# Patient Record
Sex: FEMALE | Race: AMERICAN INDIAN OR ALASKA NATIVE
[De-identification: names, ages, dates, MRNs, and addresses within clinical notes are randomized per-mention and may not be internally consistent; named-entity substitution may affect disease eponyms.]

---

## 2020-04-13 ENCOUNTER — HOSPITAL ENCOUNTER (OUTPATIENT)
Dept: HOSPITAL 5 - TRG | Age: 35
Setting detail: OBSERVATION
Discharge: HOME | End: 2020-04-13
Attending: OBSTETRICS & GYNECOLOGY | Admitting: OBSTETRICS & GYNECOLOGY
Payer: MEDICAID

## 2020-04-13 VITALS — SYSTOLIC BLOOD PRESSURE: 128 MMHG | DIASTOLIC BLOOD PRESSURE: 67 MMHG

## 2020-04-13 DIAGNOSIS — Z3A.39: ICD-10-CM

## 2020-04-13 DIAGNOSIS — O26.893: ICD-10-CM

## 2020-04-13 DIAGNOSIS — R00.0: ICD-10-CM

## 2020-04-13 LAB
ALBUMIN SERPL-MCNC: 3.3 G/DL (ref 3.9–5)
ALT SERPL-CCNC: 8 UNITS/L (ref 7–56)
BASOPHILS # (AUTO): 0 K/MM3 (ref 0–0.1)
BASOPHILS NFR BLD AUTO: 0.4 % (ref 0–1.8)
BUN SERPL-MCNC: 10 MG/DL (ref 7–17)
BUN/CREAT SERPL: 14 %
CALCIUM SERPL-MCNC: 10.1 MG/DL (ref 8.4–10.2)
CRP SERPL-MCNC: 1.6 MG/DL (ref 0–1.3)
EOSINOPHIL # BLD AUTO: 0 K/MM3 (ref 0–0.4)
EOSINOPHIL NFR BLD AUTO: 0.2 % (ref 0–4.3)
HCT VFR BLD CALC: 33.4 % (ref 30.3–42.9)
HEMOLYSIS INDEX: 25
HGB BLD-MCNC: 11 GM/DL (ref 10.1–14.3)
LYMPHOCYTES # BLD AUTO: 1.1 K/MM3 (ref 1.2–5.4)
LYMPHOCYTES NFR BLD AUTO: 17.8 % (ref 13.4–35)
MCHC RBC AUTO-ENTMCNC: 33 % (ref 30–34)
MCV RBC AUTO: 91 FL (ref 79–97)
MONOCYTES # (AUTO): 0.5 K/MM3 (ref 0–0.8)
MONOCYTES % (AUTO): 8.6 % (ref 0–7.3)
PLATELET # BLD: 199 K/MM3 (ref 140–440)
RBC # BLD AUTO: 3.68 M/MM3 (ref 3.65–5.03)

## 2020-04-13 PROCEDURE — 76815 OB US LIMITED FETUS(S): CPT

## 2020-04-13 PROCEDURE — 86140 C-REACTIVE PROTEIN: CPT

## 2020-04-13 PROCEDURE — 93010 ELECTROCARDIOGRAM REPORT: CPT

## 2020-04-13 PROCEDURE — 59025 FETAL NON-STRESS TEST: CPT

## 2020-04-13 PROCEDURE — G0378 HOSPITAL OBSERVATION PER HR: HCPCS

## 2020-04-13 PROCEDURE — 93005 ELECTROCARDIOGRAM TRACING: CPT

## 2020-04-13 PROCEDURE — 82728 ASSAY OF FERRITIN: CPT

## 2020-04-13 PROCEDURE — 36415 COLL VENOUS BLD VENIPUNCTURE: CPT

## 2020-04-13 PROCEDURE — 85025 COMPLETE CBC W/AUTO DIFF WBC: CPT

## 2020-04-13 PROCEDURE — 84145 PROCALCITONIN (PCT): CPT

## 2020-04-13 PROCEDURE — 83615 LACTATE (LD) (LDH) ENZYME: CPT

## 2020-04-13 PROCEDURE — 80053 COMPREHEN METABOLIC PANEL: CPT

## 2020-04-13 PROCEDURE — 85379 FIBRIN DEGRADATION QUANT: CPT

## 2020-04-13 NOTE — CONSULTATION
History of Present Illness


Consult date: 04/13/20


Requesting physician: LENCHO MI


Consult reason: tachycardia


History of present illness: 


The pt is a 35YO female who is 39+ wks pregnant who presented to triage with c/o

contractions. Pt states that she thought she was in labor. Per the chart, upon 

nursing assessment, she was found to have a HR of 180. Admission ECG shows NSR 

with HR WNL. There are no telemetry strips available for review. Pt denies any 

cardiac complaints. She states that she is known to have a history of 

tachycardia. She has been seen in our office by Dr. Serrano. She underwent 48Hr 

Holter study in 1/2020 which showed persistent sinus tachycardia with isolated 

PACs, otherwise no significant arrhythmias noted. Echo done 1/31/2020 showed EF 

55-60%, unremarkable except for sigmoid septum measuring 1.3cm.








Past History


Past Medical History: other (as per HPI)





Medications and Allergies


                                    Allergies











Allergy/AdvReac Type Severity Reaction Status Date / Time


 


No Known Allergies Allergy   Unverified 04/13/20 07:04














Review of Systems


Constitutional: no weight loss, no weight gain, no fever, no chills, no sweats


Ears, nose, mouth and throat: no ear pain, no nose pain, no sinus pressure, no 

sinus pain


Cardiovascular: no chest pain, no orthopnea, no palpitations, no syncope, no 

lightheadedness, no shortness of breath, no dyspnea on exertion, no high blood 

pressure


Respiratory: no cough, no shortness of breath, no dyspnea on exertion, no 

congestion, no wheezing, no pain on inspiration


Gastrointestinal: other (uterine contractions), no nausea, no vomiting, no 

diarrhea, no constipation, no change in bowel habits


Genitourinary Female: pregnant, no pelvic pain, no flank pain, no dysuria, no 

urinary frequency, no urgency


Musculoskeletal: no neck stiffness, no neck pain, no shooting arm pain, no arm 

numbness/tingling, no low back pain, no shooting leg pain


Integumentary: no rash, no pruritis, no redness, no sores, no wounds


Neurological: no head injury, no paralysis, no weakness, no parathesias, no 

numbness, no tingling, no seizures, no syncope


Psychiatric: no anxiety


Endocrine: no cold intolerance, no heat intolerance


Hematologic/Lymphatic: no easy bruising


Allergic/Immunologic: no urticaria





Physical Examination


                                   Vital Signs











Pulse BP Pulse Ox


 


 124 H  106/68   97 


 


 04/13/20 05:00  04/13/20 05:00  04/13/20 05:00











General appearance: no acute distress


HEENT: Positive: PERRL, Normocephaly, Mucus Membranes Moist


Neck: Positive: neck supple, trachea midline


Cardiac: Positive: Regular Rhythm, S1/S2


Lungs: Positive: Decreased Breath Sounds


Neuro: Positive: Grossly Intact


Abdomen: Positive: Other (pregnant)


Skin: Negative: Rash


Musculoskeletal: No Pain


Extremities: Absent: edema





Results





                                 04/13/20 06:43





                                 04/13/20 06:43


                                 Cardiac Enzymes











  04/13/20 Range/Units





  06:43 


 


AST  14  (5-40)  units/L


 


Lactate Dehydrogenase  194 H  ()  units/L








                                       CBC











  04/13/20 Range/Units





  06:43 


 


WBC  6.2  (4.5-11.0)  K/mm3


 


RBC  3.68  (3.65-5.03)  M/mm3


 


Hgb  11.0  (10.1-14.3)  gm/dl


 


Hct  33.4  (30.3-42.9)  %


 


Plt Count  199  (140-440)  K/mm3


 


Lymph #  1.1 L  (1.2-5.4)  K/mm3


 


Mono #  0.5  (0.0-0.8)  K/mm3


 


Eos #  0.0  (0.0-0.4)  K/mm3


 


Baso #  0.0  (0.0-0.1)  K/mm3








                          Comprehensive Metabolic Panel











  04/13/20 Range/Units





  06:43 


 


Sodium  136 L  (137-145)  mmol/L


 


Potassium  4.0  (3.6-5.0)  mmol/L


 


Chloride  101.3  ()  mmol/L


 


Carbon Dioxide  19 L  (22-30)  mmol/L


 


BUN  10  (7-17)  mg/dL


 


Creatinine  0.7  (0.7-1.2)  mg/dL


 


Glucose  90  ()  mg/dL


 


Calcium  10.1  (8.4-10.2)  mg/dL


 


AST  14  (5-40)  units/L


 


ALT  8  (7-56)  units/L


 


Alkaline Phosphatase  106  ()  units/L


 


Total Protein  7.2  (6.3-8.2)  g/dL


 


Albumin  3.3 L  (3.9-5)  g/dL














- Imaging and Cardiology


Echo: report reviewed (1/31/2020 showed EF 55-60%, unremarkable except for 

sigmoid septum measuring 1.3cm.)


Holter: report reviewed (48Hr Holter study in 1/2020 which showed persistent 

sinus tachycardia with isolated PACs, otherwise no significant arrhythmias 

noted.)


EKG: report reviewed, image reviewed





EKG interpretations





- Telemetry


EKG Rhythm: Sinus Rhythm





- EKG


Sinus rhythms and dysrhythmias: sinus rhythm





Assessment and Plan


Pt has known history of sinus tachycardia. She has been seen in our office by 

Dr. Serrano. She underwent 48Hr Holter study in 1/2020 which showed persistent 

sinus tachycardia with isolated PACs, otherwise no significant arrhythmias 

noted. Echo done 1/31/2020 showed EF 55-60%, unremarkable except for sigmoid 

septum measuring 1.3cm. 


Currently stable cardiac status. Pt has no current cardiac complaints. Suspect 

sinus tachycardia will improve following delivery. Per OB/GYN team, pt to 

discharge home. Nothing further to add from cardiac perspective at this time. 

Recommend pt follow up in our office with Dr. Serrano within 1 month 

([phone]). 





The patient has been seen in conjunction with Dr. De León who agrees with the 

assessment and plan of care. 








- Patient Problems


(1) Sinus tachycardia


Current Visit: Yes   Status: Acute   





(2) Pregnant


Current Visit: Yes   Status: Acute

## 2020-04-13 NOTE — EVENT NOTE
Date: 04/13/20





Maternl heart on arrival was noted to be 200 and palpated ~same by CNM. Patient 

complained of contractions and ? ROM. She denies CP or SOB. She was placed on 

cardiac monitor, EKG, CMP and CBC ordered and Hospitalist consulted. Will 

consult Cardiology as well. Now 's with fetal tachycardia. Hospitalist 

present to evaluated. Will start IVF. Observe closely

## 2020-04-13 NOTE — CONSULTATION
History of Present Illness





- History of Present Illness


34-year-old woman with no medical problems, , 37 weeks pregnant is being seen 

for tachycardia.  Patient's heart rate was in the 180s upon arrival, her heart 

rate is now 122, normal sinus rhythm without intervention.  Patient saw a 

cardiologist 1-1/2 months ago for irregular heart rate, tachycardia, he wore a 

Holter monitor.  Her evaluation showed PACs, her echo was normal, she was told 

to follow-up with cardiology in the next seven months.  The patient came to 

labor and delivery because she started having contractions.  She is comfortable 

in bed, no shortness of breath, palpitations, chest pain, currently has no 

abdominal pain or contractions, fever, sick contacts, cough


Review Of  Systems:


Constitutional: no weight loss, fever, chills


Ears, eyes, nose, mouth and throat: no nasal congestion, no nasal discharge, no 

sinus pressure, blurry vision, diplopia


Neck: No neck pain or rigidity.


Cardiovascular: No  palpitations, chest pain


Respiratory: No shortness of breath, cough


Gastrointestinal: No hematochezia


Genitourinary : no dysuria, frequency


Musculoskeletal: no muscle ache , joint pain


Integumentary: no rash, no pruritis


Neurological: no parathesias, focal weakness


Endocrine: no cold or heat intolerance, no polyuria or polydipsia


Hematologic/Lymphatic: no easy bruising, no easy bleeding, no gland swelling


Allergic/Immunologic: no urticaria, no angioedema.





PAST MEDICAL HISTORY: None





PAST SURGICAL HISTORY: Excision of fallopian tube





SOCIAL HISTORY: Denies alcohol, tobacco,  drugs





FAMILY HISTORY: Hypertension














Medications and Allergies


Active Meds: 


Active Medications





Labetalol HCl (Labetalol)  50 mg PO ONCE ONE


   Stop: 04/13/20 06:32











Exam





- Physical Exam


Narrative exam: 


Gen. appearance: Patient lying in bed, no apparent distress


HEENT: Normocephalic, atraumatic, pupils equally round and reactive to light, 

extraocular movement intact, and no sclericterus,. No JVD or thyromegaly or 

nodule,neck supple, no carotid bruit ,mucous membranes moist, no exudate or 

erythema


Heart: S1, S2, regular rate and rhythm


Lungs: Clear bilaterally, breathing comfortable


Abdomen: Positive bowel sounds, nontender, nondistended, no organomegaly


Extremity: no edema, cyanosis, clubbing


Skin: No rash, nodules, warm, dry


Neuro: Hernial nerves II to XII intact speech is fluent, moves extremities, 

sensory intact











- Constitutional


Vitals: 


                                        











Temp Pulse Resp BP Pulse Ox


 


 98.5 F   116 H  18   127/58   97 


 


 04/13/20 06:30  04/13/20 06:30  04/13/20 06:30  04/13/20 06:30  04/13/20 06:30














Assessment and Plan


Assessment


Sinus tachycardia


Give a one-time low-dose labetalol now, start fluids


Consult her cardiologist


Case discussed with Dr. Lemons


Will sign out to incoming hospistalist


labs are pending

## 2020-04-13 NOTE — ULTRASOUND REPORT
ULTRASOUND OBSTETRIC LIMITED 



INDICATION / CLINICAL INFORMATION: Fetal well being: fetal tachycardia.



TECHNIQUE: Transabdominal ultrasound imaging.



COMPARISON: None available.



FINDINGS:



FETAL HEART RATE (beats per minute): 165 



AMNIOTIC FLUID INDEX (cm) =  10.4



PRESENTATION: Cephalic. 



ADDITIONAL FINDINGS: None.



IMPRESSION:

No significant abnormality.



Signer Name: Chema Oliva Jr, MD 

Signed: 4/13/2020 8:13 AM

Workstation Name: [id number]

## 2020-04-13 NOTE — EVENT NOTE
Date: 04/13/20





Discussed with Dr De León via telephone.Patient is stable for discharge from 

cardiology standpoint ( see cardilology note) .Will discharge patient will 

normal labor precaution f/u appointment in our office on 4/15/2020.

## 2020-04-17 ENCOUNTER — HOSPITAL ENCOUNTER (OUTPATIENT)
Dept: HOSPITAL 5 - TRG | Age: 35
LOS: 1 days | Discharge: HOME | End: 2020-04-18
Attending: OBSTETRICS & GYNECOLOGY
Payer: MEDICAID

## 2020-04-17 VITALS — DIASTOLIC BLOOD PRESSURE: 71 MMHG | SYSTOLIC BLOOD PRESSURE: 117 MMHG

## 2020-04-17 DIAGNOSIS — Z3A.39: ICD-10-CM

## 2020-04-17 DIAGNOSIS — O47.1: Primary | ICD-10-CM

## 2020-04-17 PROCEDURE — 59025 FETAL NON-STRESS TEST: CPT

## 2020-04-19 ENCOUNTER — HOSPITAL ENCOUNTER (INPATIENT)
Dept: HOSPITAL 5 - LD | Age: 35
LOS: 2 days | Discharge: HOME | End: 2020-04-21
Attending: OBSTETRICS & GYNECOLOGY | Admitting: OBSTETRICS & GYNECOLOGY
Payer: MEDICAID

## 2020-04-19 DIAGNOSIS — E66.01: ICD-10-CM

## 2020-04-19 DIAGNOSIS — Z23: ICD-10-CM

## 2020-04-19 DIAGNOSIS — B00.9: ICD-10-CM

## 2020-04-19 DIAGNOSIS — Z3A.40: ICD-10-CM

## 2020-04-19 LAB
HCT VFR BLD CALC: 34 % (ref 30.3–42.9)
HGB BLD-MCNC: 11.5 GM/DL (ref 10.1–14.3)
MCHC RBC AUTO-ENTMCNC: 34 % (ref 30–34)
MCV RBC AUTO: 90 FL (ref 79–97)
PLATELET # BLD: 191 K/MM3 (ref 140–440)
RBC # BLD AUTO: 3.8 M/MM3 (ref 3.65–5.03)

## 2020-04-19 PROCEDURE — 85018 HEMOGLOBIN: CPT

## 2020-04-19 PROCEDURE — 85027 COMPLETE CBC AUTOMATED: CPT

## 2020-04-19 PROCEDURE — 86592 SYPHILIS TEST NON-TREP QUAL: CPT

## 2020-04-19 PROCEDURE — 86900 BLOOD TYPING SEROLOGIC ABO: CPT

## 2020-04-19 PROCEDURE — 59200 INSERT CERVICAL DILATOR: CPT

## 2020-04-19 PROCEDURE — 85014 HEMATOCRIT: CPT

## 2020-04-19 PROCEDURE — 86850 RBC ANTIBODY SCREEN: CPT

## 2020-04-19 PROCEDURE — 86901 BLOOD TYPING SEROLOGIC RH(D): CPT

## 2020-04-19 PROCEDURE — 36415 COLL VENOUS BLD VENIPUNCTURE: CPT

## 2020-04-19 PROCEDURE — 59025 FETAL NON-STRESS TEST: CPT

## 2020-04-19 RX ADMIN — SODIUM CHLORIDE, SODIUM LACTATE, POTASSIUM CHLORIDE, AND CALCIUM CHLORIDE SCH MLS/HR: .6; .31; .03; .02 INJECTION, SOLUTION INTRAVENOUS at 21:52

## 2020-04-19 RX ADMIN — SODIUM CHLORIDE, SODIUM LACTATE, POTASSIUM CHLORIDE, AND CALCIUM CHLORIDE SCH MLS/HR: .6; .31; .03; .02 INJECTION, SOLUTION INTRAVENOUS at 23:21

## 2020-04-19 RX ADMIN — BUTORPHANOL TARTRATE PRN MG: 2 INJECTION, SOLUTION INTRAMUSCULAR; INTRAVENOUS at 23:35

## 2020-04-19 NOTE — HISTORY AND PHYSICAL REPORT
History of Present Illness


Date of examination: 20


Date of admission: 


20 20:25





Chief complaint: 





IOL Per [facility] recommendations @ 40+01 weeks


History of present illness: 


EDC Confirmation:  2020





Past Pregnancy History 


   :      3


   Term Births:   0


   Premature Births:   1


   Living Children:   1


   Para:      1


   Mult. Births:   0


   Prev :   0


   Prev.  attempt?   none


   Aborta:      0


   Elect. Ab:      0


   Spont. Ab:      0


   Ectopics:      1





Pregnancy # 1


   Delivery date:     2012


   Weeks Gestation:   34.4


    labor:     yes


   Delivery type:     


   Anesthesia type:     none


   Infant Sex:      Female


   Birth weight:      5#


   Name:      Natalie


   Comments:      delivered at [facility], [place]





Pregnancy # 2


   Delivery date:     2013


   Comments:      ectopic








Past Medical History:


   Negative Past Medical History





Past Surgical History:


   negative





Past Medical History 


Anesthesia Complications: negative


Anemia: negative


Autoimmune Disorder: negative


Bleeding Disorder: negative


Blood Transfusions: negative


Breast Disease: negative


Diabetes: negative


Heart Disease: negative


Hypertension: negative


Hepatitis/Liver Disease: negative


Kidney Disease/UTI: negative


Neurologic/Epilepsy/Migraines: negative


Phlebitis/Varicosities: negative


Psychiatric: negative


Pulmonary Disease/Asthma: negative


Thyroid Disease: negative


Hospitalizations: negative


Surgery (Non-gyn): negative


Abnormal PAP: negative


SOSA Exposure: negative


Infertility: negative


Uterine Anomaly: negative


Uterine Surgery (not C/S): negative





Infection History 


Personal hx. of genital herpes: yes


Varicella/Chicken Pox Status: Previous Disease





Genetic History 


 Congenital Heart Defect:


    Mom: no  Dad: no


Canavan Disease:


    Mom: no  Dad: no


Thalassemia


    Mom: no  Dad: no


Neural Tube Defect


    Mom: no  Dad: no


Down's Syndrome


    Mom: no  Dad: no


Cj-Sachs


    Mom: no  Dad: no


Sickle Cell Disease/Trait


    Mom: no  Dad: no


Hemophilia


    Mom: no  Dad: no


Muscular Dystrophy


    Mom: no  Dad: no


Cystic Fibrosis


    Mom: no  Dad: no


[place] Chorea


    Mom: no  Dad: no


Mental Retardation


    Mom: no  Dad: no


Fragile X


    Mom: no  Dad: no


Other Genetic/Chromosomal Disorder


    Mom: no  Dad: no


Child w/other birth defect


    Mom: no  Dad: no





Enviromental Exposures 


Xray Exposure: no


Medication, drug, or alcohol use since LMP: no


Chemical/Other Exposure: no


Exposure to Cat Liter: no


Hx of Parvovirus (Fifth Disease): no


Occupational Exposure to Children: none


Current Allergies (reviewed today):


No known allergies








Past History


Past Medical History: other (See HPI)


Past Surgical History: other (See HPI)


GYN History: herpes


Family/Genetic History: other (see HPI)


Social history: no significant social history





- Obstetrical History


Expected Date of Delivery: 20


Actual Gestation: 40 Week(s) 1 Day(s) 


: 3


Para: 1


Hx # Term Pregnancies: 0


Number of  Pregnancies: 1


Spontaneous Abortions: 1 (ectopic)


Induced : 0


Number of Living Children: 1





Medications and Allergies


                                    Allergies











Allergy/AdvReac Type Severity Reaction Status Date / Time


 


No Known Allergies Allergy   Unverified 20 07:04











Active Meds: 


Active Medications





Butorphanol Tartrate (Stadol)  2 mg IV Q2H PRN


   PRN Reason: Pain , Severe (7-10)


Dinoprostone (Cervidil)  10 mg VG ONCE ONE


   Stop: 20 22:01


Ephedrine Sulfate (Ephedrine Sulfate)  10 mg IV Q2M PRN


   PRN Reason: Hypotension


Oxytocin/Sodium Chloride (Pitocin/Ns 20 Unit/1000ml Drip)  20 units in 1,000 mls

@ 125 mls/hr IV AS DIRECT ROSEMARIE


Lactated Ringer's (Lactated Ringers)  1,000 mls @ 125 mls/hr IV AS DIRECT ROSEMARIE


Lidocaine (Xylocaine 2%)  20 ml INFILTRATI ONCE ONE


   Stop: 20 22:01


Mineral Oil (Mineral Oil)  30 ml PO QHS PRN


   PRN Reason: Constipation


Terbutaline Sulfate (Brethine)  0.25 mg SUB-Q ONCE PRN


   PRN Reason: Hyperstimulation/Hypertonicity











Review of Systems


All systems: negative


Genitourinary: no genital sores





- Vital Signs


Vital signs: 


                                   Vital Signs











Pulse Pulse Ox


 


 89   100 


 


 20 20:57  20 20:57








                                        











Temp Pulse Resp BP Pulse Ox


 


    110 H     119/67   94 


 


    20 20:58     20 20:58  20 20:58














- Physical Exam


Breasts: Positive: normal


Cardiovascular: Regular rate


Lungs: Positive: Normal air movement


Abdomen: Positive: normal appearance, soft


Genitourinary (Female): Positive: normal external genitalia, normal perenium.  

Negative: perineal/vulvar lesions (no signs of HSV observed)


Vagina: Positive: normal moisture


Extremities: Positive: normal





- Obstetrical


FHR: auscultation normal


Uterine Contraction Monitor Mode: External


Cervical Dilatation: 2


Cervical Effacement Percentage: 65


Fetal station: -3





Results


All other labs normal.





Prenatal labs from 19:





Patient: CARLY ABBOTT


ID: [id number]


Note: All result statuses are Final unless otherwise noted.





Tests: (1) Prenatal Profile I (2029)


  Order Note: Clinical Information: SRC:UR                     


  HBsAg Screen              Negative                    Negative         *1


  RPR                       Non Reactive                Non Reactive     *2


 Rubella Antibodies, IgG


                            5.99 index                  Immune >0.99     *3


                                    Non-immune       <0.90


                                Equivocal  0.90 - 0.99


                                Immune           >0.99


   


  ABO Grouping              A                                            *4


  Rh Factor                 Positive                                     *5


    Please note: Prior records for this patient's ABO / Rh type are not


available for additional verification.


   


  Antibody Screen           Negative                    Negative         *6


  WBC                       6.6 x10E3/uL                3.4-10.8         *7


  RBC                       4.20 x10E6/uL               3.77-5.28        *8


  Hemoglobin                12.3 g/dL                   11.1-15.9        *9


  Hematocrit                37.4 %                      34.0-46.6        *10


  MCV                       89 fL                       79-97            *11


  MCH                       29.3 pg                     26.6-33.0        *12


  MCHC                      32.9 g/dL                   31.5-35.7        *13


  RDW                       14.6 %                      12.3-15.4        *14


  Platelets                 217 x10E3/uL                150-450          *15


  Neutrophils               75 %                        Not Estab.       *16


  Lymphs                    16 %                        Not Estab.       *17


  Monocytes                 8 %                         Not Estab.       *18


  Eos                       1 %                         Not Estab.       *19


  Basos                     0 %                         Not Estab.       *20


! Immature Cells            <No Reported Value>                          *21


 Neutrophils (Absolute)


                            4.9 x10E3/uL                1.4-7.0          *22


  Lymphs (Absolute)         1.1 x10E3/uL                0.7-3.1          *23


  Monocytes(Absolute)       0.5 x10E3/uL                0.1-0.9          *24


  Eos (Absolute)            0.0 x10E3/uL                0.0-0.4          *25


  Baso (Absolute)           0.0 x10E3/uL                0.0-0.2          *26


! Immature Granulocytes


                            0 %                         Not Estab.       *27


! Immature Grans (Abs)      0.0 x10E3/uL                0.0-0.1          *28


! NRBC                      <No Reported Value>                          *29


  Hematology Comments:      <No Reported Value>                          *30





Tests: (2) Panel 157401 (703932)


 HIV Screen 4th Generation wRfx


                            Non Reactive                Non Reactive     *31





Tests: (3) Gest. Diabetes 1-Hr Screen (124297)


! Gestational Diabetes Screen


                            70 mg/dL                               *32


    According to ADA, a glucose threshold of >139 mg/dL after 50-gram


load identifies approximately 80% of women with gestational


diabetes mellitus, while the sensitivity is further increased to


approximately 90% by a threshold of >129 mg/dL.


   





Tests: (4) HCV Ab w/Rflx to Verification (135966)


! HCV Ab                    <0.1 s/co ratio             0.0-0.9          *33





Tests: (5) Comment: ([id number])


! Comment:                  SPRCS                                        *34


    Non reactive HCV antibody screen is consistent with no HCV infection,


unless recent infection is suspected or other evidence exists to


indicate HCV infection.


   





Tests: (6) Urine Culture, Routine (905639)


 Urine Culture, Routine


                            Final report                                 *35





Tests: (7) Result ([id number])


! Result 1                  No growth                                    *36





Labs from 3/17/20


Tests: (1) Ct, Ng, Trich vag by [facility] ([id number])


  Order Note: Clinical Information: SRC:VR                     SRC:UR


  Chlamydia by [facility]          Negative                    Negative         *1


  Gonococcus by [facility]         Negative                    Negative         *2


  Trich vag by [facility]          Negative                    Negative         *3





Tests: (2) Strep Gp B [facility] ([id number])


! Strep Gp B [facility]            Negative                    Negative         *4


    








Assessment and Plan


 35y/o  @ 40+1 admitted for IOL per [facility] recommendations.  GBS NEG. 

Pregnancy complicated by hx  delivery, morbid obesity, maternal 

proteinuria and HSV. Admission orders in EMR. Plan on cervidil tonight and pi

tocin tomorrow. EFW by [facility] last checked 3/18/20 6#2oz.








- Patient Problems


(1) 40 weeks gestation of pregnancy


Current Visit: Yes   Status: Acute   





(2) BMI 50.0-59.9, adult


Current Visit: Yes   Status: Acute   


Plan to address problem: 


careful fetal monitoring


Internal monitoring when appropriate, if needed








(3) Gestational proteinuria without hypertension


Current Visit: Yes   Status: Acute   


Qualifiers: 


   Trimester: third trimester   Qualified Code(s): O12.13 - Gestational 

proteinuria, third trimester   


Plan to address problem: 


Monitor vistal signs and s/s pre-e








(4) HSV infection


Current Visit: Yes   Status: Acute   


Plan to address problem: 


no current signs/symptoms of outbreak.


Continue to monitor

## 2020-04-20 LAB
HCT VFR BLD CALC: 30.2 % (ref 30.3–42.9)
HGB BLD-MCNC: 10 GM/DL (ref 10.1–14.3)

## 2020-04-20 PROCEDURE — 0HQ9XZZ REPAIR PERINEUM SKIN, EXTERNAL APPROACH: ICD-10-PCS | Performed by: OBSTETRICS & GYNECOLOGY

## 2020-04-20 RX ADMIN — IBUPROFEN SCH MG: 800 TABLET, FILM COATED ORAL at 20:57

## 2020-04-20 RX ADMIN — DOCUSATE SODIUM SCH MG: 100 CAPSULE, LIQUID FILLED ORAL at 22:16

## 2020-04-20 RX ADMIN — IBUPROFEN SCH MG: 800 TABLET, FILM COATED ORAL at 14:00

## 2020-04-20 RX ADMIN — Medication SCH EACH: at 10:21

## 2020-04-20 RX ADMIN — DOCUSATE SODIUM SCH MG: 100 CAPSULE, LIQUID FILLED ORAL at 10:21

## 2020-04-20 RX ADMIN — IBUPROFEN SCH MG: 800 TABLET, FILM COATED ORAL at 08:34

## 2020-04-20 RX ADMIN — BUTORPHANOL TARTRATE PRN MG: 2 INJECTION, SOLUTION INTRAMUSCULAR; INTRAVENOUS at 02:39

## 2020-04-20 RX ADMIN — BUTORPHANOL TARTRATE PRN MG: 2 INJECTION, SOLUTION INTRAMUSCULAR; INTRAVENOUS at 00:55

## 2020-04-20 NOTE — PROCEDURE NOTE
OB Delivery Note





- Delivery


Date of Delivery: 20 (famle infant)


Assistant: ARABELLA SOLITARIO


Estimated blood loss: 300cc





- Vaginal


Delivery presentation: vertex


Delivery position: OA


Intrapartum events: none


Delivery induction: cervidil


Delivery monitor: external FHT, external uterine


Route of delivery: 


Delivery placenta: spontaneous


Delivery cord: nuchal cord, 3 umbilical vessels


Episiotomy: none


Delivery laceration: 1st degree


Anesthesia: none


Delivery comments: 


 received call @ 0317 that patient rapidly progressed to 9cms. baby del @ 0325, 

PAULIE with a loose nuchal cord with nurse. BOW ruptured after fetal head 

delivered. Placenta del intact and complete. 1st degree perineal laceration 

noted hemostatic. after reviewing options for repair with local, patient agreed 

to allow lac to heal on it's own. Lochia small. Fundus firm. Apgars 8/9, baby 

7#5oz, . Mother and baby LDR stable.








- Infant


  ** A


Apgar at 1 minute: 8


Apgar at 5 minutes: 9


Infant Gender: Female (7#5oz "Nette")

## 2020-04-21 VITALS — DIASTOLIC BLOOD PRESSURE: 51 MMHG | SYSTOLIC BLOOD PRESSURE: 89 MMHG

## 2020-04-21 PROCEDURE — 3E0234Z INTRODUCTION OF SERUM, TOXOID AND VACCINE INTO MUSCLE, PERCUTANEOUS APPROACH: ICD-10-PCS | Performed by: OBSTETRICS & GYNECOLOGY

## 2020-04-21 RX ADMIN — Medication SCH EACH: at 10:35

## 2020-04-21 RX ADMIN — IBUPROFEN SCH MG: 800 TABLET, FILM COATED ORAL at 10:34

## 2020-04-21 RX ADMIN — IBUPROFEN SCH MG: 800 TABLET, FILM COATED ORAL at 02:43

## 2020-04-21 RX ADMIN — DOCUSATE SODIUM SCH MG: 100 CAPSULE, LIQUID FILLED ORAL at 10:34

## 2020-04-21 NOTE — DISCHARGE SUMMARY
Providers





- Providers


Date of Admission: 


20 20:25





Attending physician: 


TRAV MARK





Primary care physician: 


TRAV MARK








Hospitalization


Reason for admission: active labor


Delivery: 


Episiotomy: none


Laceration: none


Other postpartum procedures: none


Postpartum complications: none


Discharge diagnosis: IUP at term delivered


[place] baby: female


Hospital course: 


S: Pt doing well. Ambulating, voiding, and passing flatus. BC: Pills. Would like

Depo before discharge home.


 O: VSS, pulse within normal limits. Fundus hard to palpate d/t body habitus. 

Minimal bleeding noted. H/H 10.0/30.2


 A: 34 y.o. s/p  @ term, stable for discharge home.


 P: Postpartum visit in 4 weeks.





Condition at discharge: Stable


Disposition: DC-01 TO HOME OR SELFCARE





Plan





- Provider Discharge Summary


Activity: routine, no sex for 6 weeks, no heavy lifting 4 weeks


Diet: routine


Instructions: routine


Additional instructions: 


[]  Smoking cessation referral if applicable(refer to patient education folder 

for contact #)


[]  Refer to [facility]'s [facility] Booklet








Call your doctor immediately for:


* Fever > 100.5


* Heavy vaginal bleeding ( >1 pad per hour)


* Severe persistent headache


* Shortness of breath


* Reddened, hot, painful area to leg or breast


* Drainage or odor from incision.





* Keep incision clean and dry at all times and follow doctor's instructions 

regarding bathing/showering











- Follow up plan


Follow up: 


TRAV MARK MD [Primary Care Provider] - 7 Days


(Congratulations!!!


Please schedule a postpartum visit in 4 weeks. 


If you have any questions or concerns, please do not hesitate to call the office

@ [phone].)


Forms:  [facility] Discharge Summary